# Patient Record
Sex: MALE | HISPANIC OR LATINO | Employment: FULL TIME | URBAN - METROPOLITAN AREA
[De-identification: names, ages, dates, MRNs, and addresses within clinical notes are randomized per-mention and may not be internally consistent; named-entity substitution may affect disease eponyms.]

---

## 2017-04-11 ENCOUNTER — OFFICE VISIT (OUTPATIENT)
Dept: MEDICAL GROUP | Facility: PHYSICIAN GROUP | Age: 47
End: 2017-04-11
Payer: COMMERCIAL

## 2017-04-11 VITALS
SYSTOLIC BLOOD PRESSURE: 138 MMHG | OXYGEN SATURATION: 97 % | TEMPERATURE: 97.3 F | WEIGHT: 195 LBS | HEART RATE: 75 BPM | DIASTOLIC BLOOD PRESSURE: 82 MMHG | HEIGHT: 72 IN | BODY MASS INDEX: 26.41 KG/M2

## 2017-04-11 DIAGNOSIS — Z13.220 ENCOUNTER FOR SCREENING FOR LIPID DISORDER: ICD-10-CM

## 2017-04-11 DIAGNOSIS — F17.200 CURRENT SMOKER: ICD-10-CM

## 2017-04-11 DIAGNOSIS — Z13.0 SCREENING FOR DEFICIENCY ANEMIA: ICD-10-CM

## 2017-04-11 DIAGNOSIS — Z00.00 ANNUAL PHYSICAL EXAM: ICD-10-CM

## 2017-04-11 DIAGNOSIS — Z23 NEED FOR VACCINATION: ICD-10-CM

## 2017-04-11 DIAGNOSIS — Z12.5 PROSTATE CANCER SCREENING: ICD-10-CM

## 2017-04-11 PROCEDURE — 90471 IMMUNIZATION ADMIN: CPT | Performed by: FAMILY MEDICINE

## 2017-04-11 PROCEDURE — 99386 PREV VISIT NEW AGE 40-64: CPT | Mod: 25 | Performed by: FAMILY MEDICINE

## 2017-04-11 PROCEDURE — 90715 TDAP VACCINE 7 YRS/> IM: CPT | Performed by: FAMILY MEDICINE

## 2017-04-11 ASSESSMENT — PAIN SCALES - GENERAL: PAINLEVEL: NO PAIN

## 2017-04-11 NOTE — PROGRESS NOTES
Fan Olivia is a 47 y.o. male here for establishing care and annual physical  HPI: Patient does not have any concerns today.  He is current someday smoker and smokes one pack of cigarettes in a   week. He is interested in stopping. He has quit smoking for 1-2 years in the past and he thinks if he tries he can do it on his own.    Current medicines (including changes today)  No current outpatient prescriptions on file.     No current facility-administered medications for this visit.     He  has no past medical history of Anemia or Asthma.  He  has past surgical history that includes cholecystectomy ().  Social History   Substance Use Topics   • Smoking status: Current Some Day Smoker -- 0.25 packs/day for 32 years     Types: Cigarettes   • Smokeless tobacco: Never Used   • Alcohol Use: Yes      Comment: occasionally     Social History     Social History Narrative   • No narrative on file     Family History   Problem Relation Age of Onset   • Cancer Mother      Unknown type   • Hypertension Father    • Cancer Sister      Breast Cancer     Family Status   Relation Status Death Age   • Mother  68     Unknown Cancer   • Father Alive    • Sister Alive          ROS  Denies fever, chills, sweats, recent weight change  Skin: has occasional itching on arms and legs,negative for rash, scaling, pigmentation, hair or nail changes.  Eyes: negative for visual blurring, double vision, eye pain, floaters and discharge from eyes  Ears/Nose/Throat: negative for tinnitus, vertigo, bleeding gums, dental problem and hoarseness, frequent URI's, sinus trouble, persistent sore throat  Respiratory: negative for persistent cough, hemoptysis, dyspnea  Cardiovascular: negative for palpitations, irregular heart beat, chest pain, or peripheral edema.  Gastrointestinal: negative for poor appetite, dysphagia, nausea, heartburn or reflux, abdominal pain, constipation or diarrhea  Genitourinary: negative for nocturia, dysuria, frequency,  "hesitancy, incontinence  Musculoskeletal: negative for joint pain/swelling and muscle pain/ soreness  Neurologic: negative for headaches, involuntary movements or tremor,muscle weakness  Psychiatric: negative for sleep disturbance, anxiety, depression  Hematologic/Lymphatic/Immunologic: negative for anemia, unusual bruising, swollen glands  Endocrine: negative for temperature intolerance, polydipsia, polyuria, unintentional weight changes.        Objective:     Blood pressure 138/82, pulse 75, temperature 36.3 °C (97.3 °F), height 1.829 m (6' 0.01\"), weight 88.451 kg (195 lb), SpO2 97 %. Body mass index is 26.44 kg/(m^2).  Physical Exam:    GEN: Alert and oriented,well appearing, no acute distress  SKIN: warm, dry to touch, no rashes or lesions in visible areas  PSYCH: mood and affect normal, judgement normal  EYES: Conjunctiva clear, lids normal,pupils equal round and reactive  ENMT: Normal external nose and ears,EACs normal appearing, TM pearly gray with normal light reflex bilaterally,nasal mucosa and turbinates normal appearing without erythema or edema, lips without lesions,good dentition,oropharynx clear  Neck : Trachea midline, no masses or swelling, no thyromegaly  LYMPHATIC : No cervical or supraclavicular lymphadenopathy  RESPIRATORY : Unlabored respiratory effort, no distress noted, clear to auscultation bilaterally, no wheeze, rhonchi, crackles  CARDIOVASCULAR: RRR, S1 S2 normal, no murmurs , gallop , no carotid bruit, no edema of the extremities, pedal pulses B/L 2+  GI: Soft, Non tender, No rebound or guarding, no hepatosplenomegaly, no masses  MUSCULOSKELETAL : Normal gait and stance, no obvious abnormalities   NEURO: No overt focal neurologic deficits,sensation intact        Assessment and Plan:   The following treatment plan was discussed    1. Annual physical exam  Exam normal.Will order routine lab test today  - CBC WITH DIFFERENTIAL; Future  - COMP METABOLIC PANEL; Future  - VITAMIN D,25 HYDROXY; " Future  - LIPID PROFILE; Future  - TDAP VACCINE =>8YO IM  - PROSTATE SPECIFIC AG SCREENING; Future    2. Need for vaccination  - TDAP VACCINE =>8YO IM    3. Current smoker  Patient is advised to stop using tobacco and tobacco products. Long term health effects of smoking reviewed, including lung cancer risk and COPD. We discussed abrupt cessation, nicotine gum or patches, medications such as bupropion or Chantix, and nicotine inhalers including electronic cigarettes. Patient wants to quit on his own without any aid.    4. Prostate cancer screening  - PROSTATE SPECIFIC AG SCREENING; Future    5. Encounter for screening for lipid disorder   LIPID PROFILE; Future    6. Screening for deficiency anemia  - CBC WITH DIFFERENTIAL; Future    Records requested.  Followup: Return in about 1 year (around 4/11/2018) for annual physical exam.         Please note that this dictation was created using voice recognition software. I have made every reasonable attempt to correct obvious errors, but I expect that there are errors of grammar and possibly content that I did not discover before finalizing the note.

## 2017-04-11 NOTE — PATIENT INSTRUCTIONS
"Usted puede dejar de fumar  (You Can Quit Smoking)  Si usted está listo para dejar de fumar o está pensando en ello, ¡felicitaciones! [place] elegido tener ced yaron más lorenzo y vivir más tiempo. Hay muchas formas de dejar de fumar. Los chicles, parches, inhaladores o aerosol nasal con nicotina pueden ayudarlo con el deseo físico. La hipnosis, los grupos de apoyo y algunos medicamentos también pueden ayudarlo.  CONSEJOS PARA DEJAR EL HÁBITO Y NO VOLVER A FUMAR  · Aprenda a predecir priscilla estados de ánimo. Evite que un mal momento sea ced excusa para fumar. Algunas situaciones podrán tentarlo.  · Pídale a priscilla amigos y compañeros que no fumen a carlin alrededor.  · Myron de carlin hogar un lugar rodolfo de humo.  · Nunca diga \"solo patricia\". [place] lleva a desear otro y otro. Recuérdese a usted mismo carlin decisión de dejar de fumar.  · En un papel, myron ced lista de priscilla razones para no fumar. Léalo al menos el mismo número de veces al día en que fume un cigarrillo. Dígase a usted mismo todos los días, \"No quiero fumar Elijo no fumar \"  · Pídale a otra persona de carlin hogar o del trabajo que lo ayude con carlin plan para dejar de fumar.  · Planifique algo para hacer enseguida después de comer o cuando jenn ced taza de café. Salga a caminar o myron algún ejercicio para animarse. [place] lo ayudará a evitar comer en exceso.  · Myron ejercicios de relajación para calmarse y disminuir el estrés. Recuerde, puede estar tenso y nervioso en las dos primeras semanas. [place] pasará.  · Encuentre nuevas actividades para mantener las dianna ocupadas Juegue con ced lapicera, ced moneda o ced bandita elástica. Garabatee o dibuje en un papel.  · Cepíllese los dientes enseguida después de comer. [place] lo ayudará a eliminar el deseo del tabaco luego de las comidas. También puede probar con un enjuague bucal.  · Pruebe con caramelos de goma, pastillas de menta, o caramelos dietéticos para mantener algo en la boca.  SI USTED FUMA Y QUIERE DEJAR EL HÁBITO:  · Evite abastecerse de " "cigarrillos. Nunca compre un cartón. Espere a que un paquete se termine antes de comprar otro.  · Nunca lleve los cigarrillos con usted en el trabajo o en carlin casa.  · Manténgalos lo más lejos posible. Déjeselos a otra persona.  · Nunca lleve los fósforos o el encendedor con usted.  · Siempre pregúntese: \"¿Necesito henok cigarrillo o sólo es un reflejo?\"  · Tavares ced apuesta con alguien a que puede dejar de fumar. Ponga el dinero de los cigarrillos en ced alcancía cada mañana. Si usted fuma, pierde el dinero. Si no fuma, al final de la semana, tendrá más dinero.  · Siga intentando. ¡Se tarda 21 días en cambiar un hábito!  · Hable con carlin médico acerca del uso de medicamentos para dejar de fumar. Entre ellos se incluyen la goma de mascar, pastillas o parches para la piel aziza reemplazo de la nicotina.     Esta información no tiene aziza fin reemplazar el consejo del médico. Asegúrese de hacerle al médico cualquier pregunta que tenga.     Document Released: 01/20/2012 Document Revised: 03/11/2013  Elsevier Interactive Patient Education ©2016 Elsevier Inc.    "

## 2017-04-11 NOTE — MR AVS SNAPSHOT
"        Fan Olivia   2017 1:00 PM   Office Visit   MRN: [id number]    Department:  [facility]   Dept Phone:  [phone]    Description:  Male : 1970   Provider:  Angie Elizalde M.D.           Reason for Visit     Establish Care wellness physical      Allergies as of 2017     Not on File      You were diagnosed with     Annual physical exam   [694662]       Need for vaccination   [375449]       Current smoker   [944136]         Vital Signs     Blood Pressure Pulse Temperature Height Weight Body Mass Index    138/82 mmHg 75 36.3 °C (97.3 °F) 1.829 m (6' 0.01\") 88.451 kg (195 lb) 26.44 kg/m2    Oxygen Saturation Smoking Status                97% Current Some Day Smoker          Basic Information     Date Of Birth Sex Race Ethnicity Preferred Language    1970 Male Unable to Obtain  Origin (Divehi,Lithuanian,Palauan,Kamaljit, etc) English      Problem List              ICD-10-CM Priority Class Noted - Resolved    Current smoker F17.200   2017 - Present      Health Maintenance        Date Due Completion Dates    IMM DTaP/Tdap/Td Vaccine (1 - Tdap) 1987 ---            Current Immunizations     Tdap Vaccine  Incomplete      Below and/or attached are the medications your provider expects you to take. Review all of your home medications and newly ordered medications with your provider and/or pharmacist. Follow medication instructions as directed by your provider and/or pharmacist. Please keep your medication list with you and share with your provider. Update the information when medications are discontinued, doses are changed, or new medications (including over-the-counter products) are added; and carry medication information at all times in the event of emergency situations     Allergies:  No Known Allergies          Medications  Valid as of: 2017 -  1:51 PM    Generic Name Brand Name Tablet Size Instructions for use    .                 Medicines prescribed " today were sent to:     [id number] - [place], [place] - [address] AT [address] [place] [address] [place] [address]    Phone: [phone] Fax: [phone]    Open 24 Hours?: No      Medication refill instructions:       If your prescription bottle indicates you have medication refills left, it is not necessary to call your provider’s office. Please contact your pharmacy and they will refill your medication.    If your prescription bottle indicates you do not have any refills left, you may request refills at any time through one of the following ways: The online [email or web address] system (except Urgent Care), by calling your provider’s office, or by asking your pharmacy to contact your provider’s office with a refill request. Medication refills are processed only during regular business hours and may not be available until the next business day. Your provider may request additional information or to have a follow-up visit with you prior to refilling your medication.   *Please Note: Medication refills are assigned a new Rx number when refilled electronically. Your pharmacy may indicate that no refills were authorized even though a new prescription for the same medication is available at the pharmacy. Please request the medicine by name with the pharmacy before contacting your provider for a refill.        Your To Do List     Future Labs/Procedures Complete By Expires    CBC WITH DIFFERENTIAL  As directed 4/12/2018    COMP METABOLIC PANEL  As directed 4/12/2018    LIPID PROFILE  As directed 4/12/2018    PROSTATE SPECIFIC AG SCREENING  As directed 4/12/2018    VITAMIN D,25 HYDROXY  As directed 4/12/2018      Instructions    Usted puede dejar de fumar  (You Can Quit Smoking)  Si usted está listo para dejar de fumar o está pensando en ello, ¡felicitaciones! [place] elegido tener ced yaron más lorenzo y vivir más tiempo. Hay muchas formas de dejar de fumar. Los chicles, parches, inhaladores o aerosol nasal con  "nicotina pueden ayudarlo con el deseo físico. La hipnosis, los grupos de apoyo y algunos medicamentos también pueden ayudarlo.  CONSEJOS PARA DEJAR EL HÁBITO Y NO VOLVER A FUMAR  · Aprenda a predecir priscilla estados de ánimo. Evite que un mal momento sea ced excusa para fumar. Algunas situaciones podrán tentarlo.  · Pídale a priscilla amigos y compañeros que no fumen a carlin alrededor.  · Myron de carlin hogar un lugar rodolfo de humo.  · Nunca diga \"solo patricia\". [place] lleva a desear otro y otro. Recuérdese a usted mismo carlin decisión de dejar de fumar.  · En un papel, myron ced lista de priscilla razones para no fumar. Léalo al menos el mismo número de veces al día en que fume un cigarrillo. Dígase a usted mismo todos los días, \"No quiero fumar Elijo no fumar \"  · Pídale a otra persona de carlin hogar o del trabajo que lo ayude con carlin plan para dejar de fumar.  · Planifique algo para hacer enseguida después de comer o cuando jenn ced taza de café. Salga a caminar o myron algún ejercicio para animarse. [place] lo ayudará a evitar comer en exceso.  · Myron ejercicios de relajación para calmarse y disminuir el estrés. Recuerde, puede estar tenso y nervioso en las dos primeras semanas. [place] pasará.  · Encuentre nuevas actividades para mantener las dianna ocupadas Juegue con ced lapicera, ced moneda o ced bandita elástica. Garabatee o dibuje en un papel.  · Cepíllese los dientes enseguida después de comer. [place] lo ayudará a eliminar el deseo del tabaco luego de las comidas. También puede probar con un enjuague bucal.  · Pruebe con caramelos de goma, pastillas de menta, o caramelos dietéticos para mantener algo en la boca.  SI USTED FUMA Y QUIERE DEJAR EL HÁBITO:  · Evite abastecerse de cigarrillos. Nunca compre un cartón. Espere a que un paquete se termine antes de comprar otro.  · Nunca lleve los cigarrillos con usted en el trabajo o en carlin casa.  · Manténgalos lo más lejos posible. Déjeselos a otra persona.  · Nunca lleve los fósforos o el encendedor con " "usted.  · Siempre pregúntese: \"¿Necesito henok cigarrillo o sólo es un reflejo?\"  · Tavares ced apuesta con alguien a que puede dejar de fumar. Ponga el dinero de los cigarrillos en ced alcancía cada mañana. Si usted fuma, pierde el dinero. Si no fuma, al final de la semana, tendrá más dinero.  · Siga intentando. ¡Se tarda 21 días en cambiar un hábito!  · Hable con carlin médico acerca del uso de medicamentos para dejar de fumar. Entre ellos se incluyen la goma de mascar, pastillas o parches para la piel aziza reemplazo de la nicotina.     Esta información no tiene aziza fin reemplazar el consejo del médico. Asegúrese de hacerle al médico cualquier pregunta que tenga.     Document Released: 01/20/2012 Document Revised: 03/11/2013  [employer] Interactive Patient Education ©2016 Elsevier Inc.            SmarTots Access Code: [email or web address]  Expires: 5/11/2017  1:17 PM    SmarTots  A secure, online tool to manage your health information     [employer] SmarTots® is a secure, online tool that connects you to your personalized health information from the privacy of your home -- day or night - making it very easy for you to manage your healthcare. Once the activation process is completed, you can even access your medical information using the SmarTots fabricio, which is available for free in the Apple Fabricio store or Google Play store.     SmarTots provides the following levels of access (as shown below):   My Chart Features   [facility] Doctor [facility]  Specialists [facility]  Urgent  Care Non-[facility]  Primary Care  Doctor   Email your healthcare team securely and privately 24/7 X X X    Manage appointments: schedule your next appointment; view details of past/upcoming appointments X      Request prescription refills. X      View recent personal medical records, including lab and immunizations X X X X   View health record, including health history, allergies, medications X X X X   Read reports about your outpatient visits, procedures, " consult and ER notes X X X X   See your discharge summary, which is a recap of your hospital and/or ER visit that includes your diagnosis, lab results, and care plan. X X       How to register for Spotivate:  1. Go to  [email or web address].  2. Click on the Sign Up Now box, which takes you to the New Member Sign Up page. You will need to provide the following information:  a. Enter your Spotivate Access Code exactly as it appears at the top of this page. (You will not need to use this code after you’ve completed the sign-up process. If you do not sign up before the expiration date, you must request a new code.)   b. Enter your date of birth.   c. Enter your home email address.   d. Click Submit, and follow the next screen’s instructions.  3. Create a Spotivate ID. This will be your Spotivate login ID and cannot be changed, so think of one that is secure and easy to remember.  4. Create a Spotivate password. You can change your password at any time.  5. Enter your Password Reset Question and Answer. This can be used at a later time if you forget your password.   6. Enter your e-mail address. This allows you to receive e-mail notifications when new information is available in Spotivate.  7. Click Sign Up. You can now view your health information.    For assistance activating your Spotivate account, call [phone]        Quit Tobacco Information     Do you want to quit using tobacco?    Quitting tobacco decreases risks of cancer, heart and lung disease, increases life expectancy, improves sense of taste and smell, and increases spending money, among other benefits.    If you are thinking about quitting, we can help.  • [employer] Quit Tobacco Program: 906.605.3300  o Program occurs weekly for four weeks and includes pharmacist consultation on products to support quitting smoking or chewing tobacco. A provider referral is needed for pharmacist consultation.  • Tobacco Users Help Hotline: 4-800-QUIT-NOW (890-7514) or  https://[place].quitlogix.org/  o Free, confidential telephone and online coaching for [place] residents. Sessions are designed on a schedule that is convenient for you. Eligible clients receive free nicotine replacement therapy.  • Nationally: www.smokefree.gov  o Information and professional assistance to support both immediate and long-term needs as you become, and remain, a non-smoker. Smokefree.gov allows you to choose the help that best fits your needs.

## 2017-04-17 ENCOUNTER — HOSPITAL ENCOUNTER (OUTPATIENT)
Dept: LAB | Facility: MEDICAL CENTER | Age: 47
End: 2017-04-17
Attending: FAMILY MEDICINE
Payer: COMMERCIAL

## 2017-04-17 DIAGNOSIS — E55.9 VITAMIN D DEFICIENCY: ICD-10-CM

## 2017-04-17 DIAGNOSIS — Z13.0 SCREENING FOR DEFICIENCY ANEMIA: ICD-10-CM

## 2017-04-17 DIAGNOSIS — R73.01 ELEVATED FASTING GLUCOSE: ICD-10-CM

## 2017-04-17 DIAGNOSIS — Z00.00 ANNUAL PHYSICAL EXAM: ICD-10-CM

## 2017-04-17 LAB
25(OH)D3 SERPL-MCNC: 9 NG/ML (ref 30–100)
ALBUMIN SERPL BCP-MCNC: 4.1 G/DL (ref 3.2–4.9)
ALBUMIN/GLOB SERPL: 1.4 G/DL
ALP SERPL-CCNC: 55 U/L (ref 30–99)
ALT SERPL-CCNC: 39 U/L (ref 2–50)
ANION GAP SERPL CALC-SCNC: 5 MMOL/L (ref 0–11.9)
AST SERPL-CCNC: 18 U/L (ref 12–45)
BASOPHILS # BLD AUTO: 0.9 % (ref 0–1.8)
BASOPHILS # BLD: 0.08 K/UL (ref 0–0.12)
BILIRUB SERPL-MCNC: 0.5 MG/DL (ref 0.1–1.5)
BUN SERPL-MCNC: 14 MG/DL (ref 8–22)
CALCIUM SERPL-MCNC: 9.2 MG/DL (ref 8.5–10.5)
CHLORIDE SERPL-SCNC: 108 MMOL/L (ref 96–112)
CHOLEST SERPL-MCNC: 132 MG/DL (ref 100–199)
CO2 SERPL-SCNC: 28 MMOL/L (ref 20–33)
CREAT SERPL-MCNC: 1.02 MG/DL (ref 0.5–1.4)
EOSINOPHIL # BLD AUTO: 0.33 K/UL (ref 0–0.51)
EOSINOPHIL NFR BLD: 3.7 % (ref 0–6.9)
ERYTHROCYTE [DISTWIDTH] IN BLOOD BY AUTOMATED COUNT: 41.5 FL (ref 35.9–50)
GFR SERPL CREATININE-BSD FRML MDRD: >60 ML/MIN/1.73 M 2
GLOBULIN SER CALC-MCNC: 2.9 G/DL (ref 1.9–3.5)
GLUCOSE SERPL-MCNC: 106 MG/DL (ref 65–99)
HCT VFR BLD AUTO: 50.9 % (ref 42–52)
HDLC SERPL-MCNC: 43 MG/DL
HGB BLD-MCNC: 17.1 G/DL (ref 14–18)
IMM GRANULOCYTES # BLD AUTO: 0.05 K/UL (ref 0–0.11)
IMM GRANULOCYTES NFR BLD AUTO: 0.6 % (ref 0–0.9)
LDLC SERPL CALC-MCNC: 63 MG/DL
LYMPHOCYTES # BLD AUTO: 3.73 K/UL (ref 1–4.8)
LYMPHOCYTES NFR BLD: 41.6 % (ref 22–41)
MCH RBC QN AUTO: 30.1 PG (ref 27–33)
MCHC RBC AUTO-ENTMCNC: 33.6 G/DL (ref 33.7–35.3)
MCV RBC AUTO: 89.6 FL (ref 81.4–97.8)
MONOCYTES # BLD AUTO: 0.53 K/UL (ref 0–0.85)
MONOCYTES NFR BLD AUTO: 5.9 % (ref 0–13.4)
NEUTROPHILS # BLD AUTO: 4.24 K/UL (ref 1.82–7.42)
NEUTROPHILS NFR BLD: 47.3 % (ref 44–72)
NRBC # BLD AUTO: 0 K/UL
NRBC BLD AUTO-RTO: 0 /100 WBC
PLATELET # BLD AUTO: 160 K/UL (ref 164–446)
PMV BLD AUTO: 12.2 FL (ref 9–12.9)
POTASSIUM SERPL-SCNC: 4.3 MMOL/L (ref 3.6–5.5)
PROT SERPL-MCNC: 7 G/DL (ref 6–8.2)
PSA SERPL-MCNC: 0.39 NG/ML (ref 0–4)
RBC # BLD AUTO: 5.68 M/UL (ref 4.7–6.1)
SODIUM SERPL-SCNC: 141 MMOL/L (ref 135–145)
TRIGL SERPL-MCNC: 131 MG/DL (ref 0–149)
WBC # BLD AUTO: 9 K/UL (ref 4.8–10.8)

## 2017-04-17 PROCEDURE — 36415 COLL VENOUS BLD VENIPUNCTURE: CPT

## 2017-04-17 PROCEDURE — 85025 COMPLETE CBC W/AUTO DIFF WBC: CPT

## 2017-04-17 PROCEDURE — 80061 LIPID PANEL: CPT

## 2017-04-17 PROCEDURE — 84153 ASSAY OF PSA TOTAL: CPT

## 2017-04-17 PROCEDURE — 80053 COMPREHEN METABOLIC PANEL: CPT

## 2017-04-17 PROCEDURE — 82306 VITAMIN D 25 HYDROXY: CPT

## 2017-04-19 ENCOUNTER — TELEPHONE (OUTPATIENT)
Dept: MEDICAL GROUP | Facility: PHYSICIAN GROUP | Age: 47
End: 2017-04-19

## 2017-04-19 NOTE — TELEPHONE ENCOUNTER
----- Message from Angie Elizalde M.D. sent at 4/17/2017  4:15 PM PDT -----  Please inform patient that I received his blood test results.His blood sugar is slightly elevated.I have ordered another test A1C to see if he is diabetic or at risk for diabetes.Please ask him to get it done.  Also his Vitamin D level is low.I have sent prescription for Vitamin D capsules to his pharmacy.We will recheck level in 6 months.  All other tests including cholesterol, screening for prostate cancer etc are normal.    Angie Elizalde M.D.

## 2017-06-14 ENCOUNTER — OFFICE VISIT (OUTPATIENT)
Dept: URGENT CARE | Facility: PHYSICIAN GROUP | Age: 47
End: 2017-06-14
Payer: COMMERCIAL

## 2017-06-14 VITALS
OXYGEN SATURATION: 97 % | TEMPERATURE: 99.5 F | HEART RATE: 88 BPM | WEIGHT: 200 LBS | DIASTOLIC BLOOD PRESSURE: 86 MMHG | BODY MASS INDEX: 27.12 KG/M2 | RESPIRATION RATE: 14 BRPM | SYSTOLIC BLOOD PRESSURE: 134 MMHG

## 2017-06-14 DIAGNOSIS — J01.40 ACUTE NON-RECURRENT PANSINUSITIS: ICD-10-CM

## 2017-06-14 DIAGNOSIS — J10.1 INFLUENZA B: Primary | ICD-10-CM

## 2017-06-14 DIAGNOSIS — J06.9 URI WITH COUGH AND CONGESTION: ICD-10-CM

## 2017-06-14 DIAGNOSIS — R52 BODY ACHES: ICD-10-CM

## 2017-06-14 LAB
FLUAV+FLUBV AG SPEC QL IA: NORMAL
INT CON NEG: NEGATIVE
INT CON POS: POSITIVE

## 2017-06-14 PROCEDURE — 87804 INFLUENZA ASSAY W/OPTIC: CPT | Performed by: PHYSICIAN ASSISTANT

## 2017-06-14 PROCEDURE — 99204 OFFICE O/P NEW MOD 45 MIN: CPT | Performed by: PHYSICIAN ASSISTANT

## 2017-06-14 RX ORDER — OSELTAMIVIR PHOSPHATE 75 MG/1
75 CAPSULE ORAL 2 TIMES DAILY
Qty: 10 CAP | Refills: 0 | Status: SHIPPED | OUTPATIENT
Start: 2017-06-14 | End: 2017-06-19

## 2017-06-14 RX ORDER — AMOXICILLIN AND CLAVULANATE POTASSIUM 875; 125 MG/1; MG/1
1 TABLET, FILM COATED ORAL 2 TIMES DAILY
Qty: 20 TAB | Refills: 0 | Status: CANCELLED | OUTPATIENT
Start: 2017-06-14 | End: 2017-06-24

## 2017-06-14 RX ORDER — PROMETHAZINE HYDROCHLORIDE AND CODEINE PHOSPHATE 6.25; 1 MG/5ML; MG/5ML
5 SYRUP ORAL 4 TIMES DAILY PRN
Qty: 120 ML | Refills: 0 | Status: SHIPPED | OUTPATIENT
Start: 2017-06-14 | End: 2017-06-21

## 2017-06-14 NOTE — Clinical Note
June 14, 2017       Patient: Fan Olivia   YOB: 1970   Date of Visit: 6/14/2017         To Whom It May Concern:    It is my medical opinion that Fan Olivia may be excused from work for the dates of 6/14/17-6/16/17.      If you have any questions or concerns, please don't hesitate to call [phone]          Sincerely,          Max Thayer PA-C  Electronically Signed

## 2017-06-14 NOTE — PATIENT INSTRUCTIONS
"Influenza Facts  Flu (influenza) is a contagious respiratory illness caused by the influenza viruses. It can cause mild to severe illness. While most healthy people recover from the flu without specific treatment and without complications, older people, young children, and people with certain health conditions are at higher risk for serious complications from the flu, including death.  CAUSES   · The flu virus is spread from person to person by respiratory droplets from coughing and sneezing.   · A person can also become infected by touching an object or surface with a virus on it and then touching their mouth, eye or nose.   · Adults may be able to infect others from 1 day before symptoms occur and up to 7 days after getting sick. So it is possible to give someone the flu even before you know you are sick and continue to infect others while you are sick.   SYMPTOMS   · Fever (usually high).   · Headache.   · Tiredness (can be extreme).   · Cough.   · Sore throat.   · Runny or stuffy nose.   · Body aches.   · Diarrhea and vomiting may also occur, particularly in children.   · These symptoms are referred to as \"flu-like symptoms\". A lot of different illnesses, including the common cold, can have similar symptoms.   DIAGNOSIS   · There are tests that can determine if you have the flu as long you are tested within the first 2 or 3 days of illness.   · A doctor's exam and additional tests may be needed to identify if you have a disease that is a complicating the flu.   RISKS AND COMPLICATIONS   Some of the complications caused by the flu include:  · Bacterial pneumonia or progressive pneumonia caused by the flu virus.   · Loss of body fluids (dehydration).   · Worsening of chronic medical conditions, such as heart failure, asthma, or diabetes.   · Sinus problems and ear infections.   HOME CARE INSTRUCTIONS   · Seek medical care early on.   · If you are at high risk from complications of the flu, consult your health-care " provider as soon as you develop flu-like symptoms. Those at high risk for complications include:   · People 65 years or older.   · People with chronic medical conditions, including diabetes.   · Pregnant women.   · Young children.   · Your caregiver may recommend use of an antiviral medication to help treat the flu.   · If you get the flu, get plenty of rest, drink a lot of liquids, and avoid using alcohol and tobacco.   · You can take over-the-counter medications to relieve the symptoms of the flu if your caregiver approves. (Never give aspirin to children or teenagers who have flu-like symptoms, particularly fever).   PREVENTION   The single best way to prevent the flu is to get a flu vaccine each fall. Other measures that can help protect against the flu are:  · Antiviral Medications   · A number of antiviral drugs are approved for use in preventing the flu. These are prescription medications, and a doctor should be consulted before they are used.   · Habits for Good Health   · Cover your nose and mouth with a tissue when you cough or sneeze, throw the tissue away after you use it.   · Wash your hands often with soap and water, especially after you cough or sneeze. If you are not near water, use an alcohol-based hand .   · Avoid people who are sick.   · If you get the flu, stay home from work or school. Avoid contact with other people so that you do not make them sick, too.   · Try not to touch your eyes, nose, or mouth as germs ore often spread this way.   IN CHILDREN, EMERGENCY WARNING SIGNS THAT NEED URGENT MEDICAL ATTENTION:  · Fast breathing or trouble breathing.   · Bluish skin color.   · Not drinking enough fluids.   · Not waking up or not interacting.   · Being so irritable that the child does not want to be held.   · Flu-like symptoms improve but then return with fever and worse cough.   · Fever with a rash.   IN ADULTS, EMERGENCY WARNING SIGNS THAT NEED URGENT MEDICAL ATTENTION:  · Difficulty  breathing or shortness of breath.   · Pain or pressure in the chest or abdomen.   · Sudden dizziness.   · Confusion.   · Severe or persistent vomiting.   SEEK IMMEDIATE MEDICAL CARE IF:   You or someone you know is experiencing any of the symptoms above. When you arrive at the emergency center,report that you think you have the flu. You may be asked to wear a mask and/or sit in a secluded area to protect others from getting sick.  MAKE SURE YOU:   · Understand these instructions.   · Monitor your condition.   · Seek medical care if you are getting worse, or not improving.   Document Released: 12/20/2004 Document Revised: 03/11/2013 Document Reviewed: 09/16/2010  SafeStore® Patient Information ©2013 [employer].

## 2017-06-14 NOTE — MR AVS SNAPSHOT
Fan Olivia   2017 12:45 PM   Office Visit   MRN: [id number]    Department:  [facility]   Dept Phone:  [phone]    Description:  Male : 1970   Provider:  Max Thayer PA-C           Reason for Visit     Cold Symptoms fever body aches, ear pain, sinus headaches x4days      Allergies as of 2017     No Known Allergies      You were diagnosed with     Influenza B   [916931]  -  Primary     Acute non-recurrent pansinusitis   [5128383]       URI with cough and congestion   [2813460]       Body aches   [724135]         Vital Signs     Blood Pressure Pulse Temperature Respirations Weight Oxygen Saturation    134/86 mmHg 88 37.5 °C (99.5 °F) 14 90.719 kg (200 lb) 97%    Smoking Status                   Current Some Day Smoker           Basic Information     Date Of Birth Sex Race Ethnicity Preferred Language    1970 Male Unable to Obtain  Origin (Italian,Fijian,Russian,Citizen of [place], etc) English      Problem List              ICD-10-CM Priority Class Noted - Resolved    Current smoker F17.200   2017 - Present    Elevated fasting glucose R73.01   2017 - Present    Vitamin D deficiency E55.9   2017 - Present      Health Maintenance        Date Due Completion Dates    IMM PNEUMOCOCCAL 19-64 (ADULT) MEDIUM RISK SERIES (1 of 1 - PPSV23) 1987 ---    IMM DTaP/Tdap/Td Vaccine (2 - Td) 2027            Current Immunizations     Tdap Vaccine 2017      Below and/or attached are the medications your provider expects you to take. Review all of your home medications and newly ordered medications with your provider and/or pharmacist. Follow medication instructions as directed by your provider and/or pharmacist. Please keep your medication list with you and share with your provider. Update the information when medications are discontinued, doses are changed, or new medications (including over-the-counter products) are added; and carry medication  information at all times in the event of emergency situations     Allergies:  No Known Allergies          Medications  Valid as of: June 14, 2017 -  1:19 PM    Generic Name Brand Name Tablet Size Instructions for use    Cholecalciferol (Cap) Cholecalciferol 88157 UNIT Take 1 capsule orally once weekly for 8 weeks, followed by 1 capsule twice monthly for maintenance.        Oseltamivir Phosphate (Cap) TAMIFLU 75 MG Take 1 Cap by mouth 2 times a day for 5 days.        Promethazine-Codeine (Syrup) PHENERGAN-CODEINE 6.25-10 MG/5ML Take 5 mL by mouth 4 times a day as needed for Cough for up to 7 days.        .                 Medicines prescribed today were sent to:     [facility][id number] - [place], [place] - [address] [place] [address]    Phone: [phone] Fax: [phone]    Open 24 Hours?: No      Medication refill instructions:       If your prescription bottle indicates you have medication refills left, it is not necessary to call your provider’s office. Please contact your pharmacy and they will refill your medication.    If your prescription bottle indicates you do not have any refills left, you may request refills at any time through one of the following ways: The online [employer] system (except Urgent Care), by calling your provider’s office, or by asking your pharmacy to contact your provider’s office with a refill request. Medication refills are processed only during regular business hours and may not be available until the next business day. Your provider may request additional information or to have a follow-up visit with you prior to refilling your medication.   *Please Note: Medication refills are assigned a new Rx number when refilled electronically. Your pharmacy may indicate that no refills were authorized even though a new prescription for the same medication is available at the pharmacy. Please request the medicine by name with the pharmacy before  "contacting your provider for a refill.        Instructions    Influenza Facts  Flu (influenza) is a contagious respiratory illness caused by the influenza viruses. It can cause mild to severe illness. While most healthy people recover from the flu without specific treatment and without complications, older people, young children, and people with certain health conditions are at higher risk for serious complications from the flu, including death.  CAUSES   · The flu virus is spread from person to person by respiratory droplets from coughing and sneezing.   · A person can also become infected by touching an object or surface with a virus on it and then touching their mouth, eye or nose.   · Adults may be able to infect others from 1 day before symptoms occur and up to 7 days after getting sick. So it is possible to give someone the flu even before you know you are sick and continue to infect others while you are sick.   SYMPTOMS   · Fever (usually high).   · Headache.   · Tiredness (can be extreme).   · Cough.   · Sore throat.   · Runny or stuffy nose.   · Body aches.   · Diarrhea and vomiting may also occur, particularly in children.   · These symptoms are referred to as \"flu-like symptoms\". A lot of different illnesses, including the common cold, can have similar symptoms.   DIAGNOSIS   · There are tests that can determine if you have the flu as long you are tested within the first 2 or 3 days of illness.   · A doctor's exam and additional tests may be needed to identify if you have a disease that is a complicating the flu.   RISKS AND COMPLICATIONS   Some of the complications caused by the flu include:  · Bacterial pneumonia or progressive pneumonia caused by the flu virus.   · Loss of body fluids (dehydration).   · Worsening of chronic medical conditions, such as heart failure, asthma, or diabetes.   · Sinus problems and ear infections.   HOME CARE INSTRUCTIONS   · Seek medical care early on.   · If you are at " high risk from complications of the flu, consult your health-care provider as soon as you develop flu-like symptoms. Those at high risk for complications include:   · People 65 years or older.   · People with chronic medical conditions, including diabetes.   · Pregnant women.   · Young children.   · Your caregiver may recommend use of an antiviral medication to help treat the flu.   · If you get the flu, get plenty of rest, drink a lot of liquids, and avoid using alcohol and tobacco.   · You can take over-the-counter medications to relieve the symptoms of the flu if your caregiver approves. (Never give aspirin to children or teenagers who have flu-like symptoms, particularly fever).   PREVENTION   The single best way to prevent the flu is to get a flu vaccine each fall. Other measures that can help protect against the flu are:  · Antiviral Medications   · A number of antiviral drugs are approved for use in preventing the flu. These are prescription medications, and a doctor should be consulted before they are used.   · Habits for Good Health   · Cover your nose and mouth with a tissue when you cough or sneeze, throw the tissue away after you use it.   · Wash your hands often with soap and water, especially after you cough or sneeze. If you are not near water, use an alcohol-based hand .   · Avoid people who are sick.   · If you get the flu, stay home from work or school. Avoid contact with other people so that you do not make them sick, too.   · Try not to touch your eyes, nose, or mouth as germs ore often spread this way.   IN CHILDREN, EMERGENCY WARNING SIGNS THAT NEED URGENT MEDICAL ATTENTION:  · Fast breathing or trouble breathing.   · Bluish skin color.   · Not drinking enough fluids.   · Not waking up or not interacting.   · Being so irritable that the child does not want to be held.   · Flu-like symptoms improve but then return with fever and worse cough.   · Fever with a rash.   IN ADULTS, EMERGENCY  WARNING SIGNS THAT NEED URGENT MEDICAL ATTENTION:  · Difficulty breathing or shortness of breath.   · Pain or pressure in the chest or abdomen.   · Sudden dizziness.   · Confusion.   · Severe or persistent vomiting.   SEEK IMMEDIATE MEDICAL CARE IF:   You or someone you know is experiencing any of the symptoms above. When you arrive at the emergency center,report that you think you have the flu. You may be asked to wear a mask and/or sit in a secluded area to protect others from getting sick.  MAKE SURE YOU:   · Understand these instructions.   · Monitor your condition.   · Seek medical care if you are getting worse, or not improving.   Document Released: 12/20/2004 Document Revised: 03/11/2013 Document Reviewed: 09/16/2010  ExitCare® Patient Information ©2013 [employer].            FID3 Access Code: [email or web address]  Expires: 7/14/2017  1:19 PM    FID3  A secure, online tool to manage your health information     [employer]’s FID3® is a secure, online tool that connects you to your personalized health information from the privacy of your home -- day or night - making it very easy for you to manage your healthcare. Once the activation process is completed, you can even access your medical information using the FID3 fabricio, which is available for free in the Apple [employer]io store or Google Play store.     FID3 provides the following levels of access (as shown below):   My Chart Features   [facility] Doctor [facility]  Specialists [facility]  Urgent  Care Non-[facility]  Primary Care  Doctor   Email your healthcare team securely and privately 24/7 X X X    Manage appointments: schedule your next appointment; view details of past/upcoming appointments X      Request prescription refills. X      View recent personal medical records, including lab and immunizations X X X X   View health record, including health history, allergies, medications X X X X   Read reports about your outpatient visits, procedures,  consult and ER notes X X X X   See your discharge summary, which is a recap of your hospital and/or ER visit that includes your diagnosis, lab results, and care plan. X X       How to register for Rev[employer]:  1. Go to  [email or web address].  2. Click on the Sign Up Now box, which takes you to the New Member Sign Up page. You will need to provide the following information:  a. Enter your Revisu Access Code exactly as it appears at the top of this page. (You will not need to use this code after you’ve completed the sign-up process. If you do not sign up before the expiration date, you must request a new code.)   b. Enter your date of birth.   c. Enter your home email address.   d. Click Submit, and follow the next screen’s instructions.  3. Create a Revisu ID. This will be your Revisu login ID and cannot be changed, so think of one that is secure and easy to remember.  4. Create a Revisu password. You can change your password at any time.  5. Enter your Password Reset Question and Answer. This can be used at a later time if you forget your password.   6. Enter your e-mail address. This allows you to receive e-mail notifications when new information is available in Revisu.  7. Click Sign Up. You can now view your health information.    For assistance activating your Revisu account, call [phone]        Quit Tobacco Information     Do you want to quit using tobacco?    Quitting tobacco decreases risks of cancer, heart and lung disease, increases life expectancy, improves sense of taste and smell, and increases spending money, among other benefits.    If you are thinking about quitting, we can help.  • [facility] Quit Tobacco Program: 864.230.3852  o Program occurs weekly for four weeks and includes pharmacist consultation on products to support quitting smoking or chewing tobacco. A provider referral is needed for pharmacist consultation.  • Tobacco Users Help Hotline: 0-800-QUIT-NOW (778-3729) or  https://[place].quitlogix.org/  o Free, confidential telephone and online coaching for [place] residents. Sessions are designed on a schedule that is convenient for you. Eligible clients receive free nicotine replacement therapy.  • Nationally: www.smokefree.gov  o Information and professional assistance to support both immediate and long-term needs as you become, and remain, a non-smoker. Smokefree.gov allows you to choose the help that best fits your needs.

## 2017-06-14 NOTE — PROGRESS NOTES
Subjective:    Pt is a 47 y.o. male who presents with Cold Symptoms            HPI  PT presents to  clinic today complaining of sore throat, fevers, chills, watery eyes, pressure in ears, cough, fatigue, runny nose. PT denies CP, SOB, NVD, abdominal pain, joint pain. PT states these symptoms began around 4 days ago and that the pt's family has been sick on and off for the last week. Pt has not taken any medications for this condition. PT states the pain is a 7/10 with sinus pressure and coughing fits, aching in nature and worse at night. The pt's medication list, problem list, and allergies have been evaluated and reviewed during today's visit.      PMH:  Negative per pt.      PSH:  Past Surgical History   Procedure Laterality Date   • Cholecystectomy         Fam Hx:    family history includes Cancer in his mother and sister; Hypertension in his father.  Family Status   Relation Status Death Age   • Mother  68     Unknown Cancer   • Father Alive    • Sister Alive        Soc HX:  Social History     Social History   • Marital Status:      Spouse Name: N/A   • Number of Children: N/A   • Years of Education: N/A     Occupational History   • Not on file.     Social History Main Topics   • Smoking status: Current Some Day Smoker -- 0.25 packs/day for 32 years     Types: Cigarettes   • Smokeless tobacco: Never Used   • Alcohol Use: Yes      Comment: occasionally   • Drug Use: No   • Sexual Activity:     Partners: Female     Other Topics Concern   • Not on file     Social History Narrative   • No narrative on file         Medications:    Current outpatient prescriptions:   •  Cholecalciferol 25860 UNIT Cap, Take 1 capsule orally once weekly for 8 weeks, followed by 1 capsule twice monthly for maintenance., Disp: 30 Cap, Rfl: 0      Allergies:  Review of patient's allergies indicates no known allergies.      ROS  Constitutional: Positive for chills and malaise/fatigue.   HENT: Positive for congestion and  sore throat. Negative for ear pain.    Eyes: Negative for blurred vision, double vision and photophobia.   Respiratory: Positive for cough and sputum production. Negative for hemoptysis, shortness of breath and wheezing.    Cardiovascular: Negative for chest pain and palpitations.   Gastrointestinal: Negative for nausea, vomiting, abdominal pain, diarrhea and constipation.   Genitourinary: Negative for dysuria and flank pain.   Musculoskeletal: Negative for falls and myalgias.   Skin: Negative for itching and rash.   Neurological: Positive for headaches. Negative for dizziness and tingling.   Endo/Heme/Allergies: Does not bruise/bleed easily.   Psychiatric/Behavioral: Negative for depression. The patient is not nervous/anxious.    All other systems reviewed and are negative.         Objective:     /86 mmHg  Pulse 88  Temp(Src) 37.5 °C (99.5 °F)  Resp 14  Wt 90.719 kg (200 lb)  SpO2 97%     Physical Exam      Constitutional: PT is oriented to person, place, and time. PT appears well-developed and well-nourished. No distress.   HENT:   Head: Normocephalic and atraumatic.   Right Ear: Hearing, tympanic membrane, external ear and ear canal normal.   Left Ear: Hearing, tympanic membrane, external ear and ear canal normal.   Nose: Mucosal edema, rhinorrhea and sinus tenderness present. Right sinus exhibits frontal sinus tenderness. Left sinus exhibits frontal sinus tenderness.   Mouth/Throat: Uvula is midline. Mucous membranes are pale. Posterior oropharyngeal edema and posterior oropharyngeal erythema present. No oropharyngeal exudate.   Eyes: Conjunctivae normal and EOM are normal. Pupils are equal, round, and reactive to light.   Neck: Normal range of motion. Neck supple. No thyromegaly present.   Cardiovascular: Normal rate, regular rhythm, normal heart sounds and intact distal pulses.  Exam reveals no gallop and no friction rub.    No murmur heard.  Pulmonary/Chest: Effort normal and breath sounds normal.  No respiratory distress. PT has no wheezes. PT has no rales. PT exhibits no tenderness.   Abdominal: Soft. Bowel sounds are normal. PT exhibits no distension and no mass. There is no tenderness. There is no rebound and no guarding.   Musculoskeletal: Normal range of motion. PT exhibits no edema and no tenderness.   Lymphadenopathy:     PT has no cervical adenopathy.   Neurological: PT is alert and oriented to person, place, and time. PT displays normal reflexes. No cranial nerve deficit. PT exhibits normal muscle tone. Coordination normal.   Skin: Skin is warm and dry. No rash noted. No erythema.   Psychiatric: PT has a normal mood and affect. PT behavior is normal. Judgment and thought content normal.          Assessment/Plan:     1. Influenza B    - Tamiflu    2. Acute non-recurrent pansinusitis      2. URI with cough and congestion    - POCT Influenza A/B-->POSB  - promethazine-codeine (PHENERGAN-CODEINE) 6.25-10 MG/5ML Syrup; Take 5 mL by mouth 4 times a day as needed for Cough for up to 7 days.  Dispense: 120 mL; Refill: 0    3. Body aches    Rest, fluids encouraged.  OTC decongestant for congestion/cough  AVS with medical info given.  Pt was in full understanding and agreement with the plan.  Follow-up as needed if symptoms worsen or fail to improve.

## 2021-10-26 ENCOUNTER — OFFICE VISIT (OUTPATIENT)
Dept: MEDICAL GROUP | Facility: CLINIC | Age: 51
End: 2021-10-26
Payer: COMMERCIAL

## 2021-10-26 VITALS
RESPIRATION RATE: 16 BRPM | SYSTOLIC BLOOD PRESSURE: 146 MMHG | TEMPERATURE: 97.4 F | DIASTOLIC BLOOD PRESSURE: 89 MMHG | WEIGHT: 192 LBS | BODY MASS INDEX: 26.01 KG/M2 | HEART RATE: 66 BPM | OXYGEN SATURATION: 94 % | HEIGHT: 72 IN

## 2021-10-26 DIAGNOSIS — H93.11 TINNITUS OF RIGHT EAR: ICD-10-CM

## 2021-10-26 DIAGNOSIS — R06.83 SNORING: ICD-10-CM

## 2021-10-26 DIAGNOSIS — R51.9 NONINTRACTABLE HEADACHE, UNSPECIFIED CHRONICITY PATTERN, UNSPECIFIED HEADACHE TYPE: ICD-10-CM

## 2021-10-26 PROBLEM — H93.19 TINNITUS: Status: ACTIVE | Noted: 2021-10-26

## 2021-10-26 PROCEDURE — 99203 OFFICE O/P NEW LOW 30 MIN: CPT | Mod: GE | Performed by: STUDENT IN AN ORGANIZED HEALTH CARE EDUCATION/TRAINING PROGRAM

## 2021-10-26 RX ORDER — AMITRIPTYLINE HYDROCHLORIDE 10 MG/1
10 TABLET, FILM COATED ORAL
Qty: 90 TABLET | Refills: 1 | Status: SHIPPED | OUTPATIENT
Start: 2021-10-26

## 2021-10-26 NOTE — ASSESSMENT & PLAN NOTE
Patient with history of headaches, describes as tension-like  -Has them about 3-4 times per day Tylenol/Motrin helps a little bit  -Interested in starting a daily medicine    -Amitriptyline prescribed for patient. F/u in ~1 month

## 2021-10-26 NOTE — ASSESSMENT & PLAN NOTE
- Patient reports that he has been snoring, feels fatigue during the day, Mallampati score of 3  - Agrees to sleep study     - Sleep study ordered today

## 2021-10-26 NOTE — PATIENT INSTRUCTIONS
SLEEP STUDY INSTRUCTIONS    1. Our main concern is to provide the best test and evaluation of your sleep and your cooperation in following the guidelines is very necessary.    2. We have no facilities for family members or guests at the sleep center. Special arrangements will be made for children requiring overnight sleep studies.    3. Unless otherwise instructed, AVOID alcoholic beverages on the day of your sleep study.    4. DO NOT drink coffee or caffeine-containing beverages after 12:00 noon on the day of your sleep study.    5. There is NO smoking at the sleep center.    6. Try to maintain a usual daytime schedule prior to the study (avoid unusual physical activity or meals).    7. DO NOT take a nap on the day of your study.    8. This is an outpatient procedure (test); therefore, nursing services, medications, and meals ARE NOT provided. If you take medications, bring them with you and take them on the schedule you do at home.    9. Please fill your sleep aid prescription (Ambien or Lunesta) and bring to your sleep study. Even patients who normally have no problem going to sleep often need a sleep aid in this different environment.    10. We ask that you wear conventional sleep attire (pajamas or sweats) for the sleep study. We discourage patients from wearing only their underwear to bed. We recommend two-piece pajamas as the techs will need to apply sensors to your stomach.    11. Please shampoo your hair the day of the sleep study. Please DO NOT use any other hair or skin products before your arrival (e.g., mousse, gel, hair or body spray, perfume, body lotion etc.) NOTE: Women should not wear heavy makeup prior to arrival as some wires are taped to the face area.    12. The technician will be applying several small electrodes to the scalp, eye area, chin, chest, and legs, plus respiratory effort belts around the chest. Also, there will be a device placed directly under the nose. (THIS WILL NOT OBSTRUCT  YOUR BREATHING.) This is a painless procedure and the skin is not broken.    13. The test is generally completed in six to eight hours; We are usually done between 6 - 7 a.m., unless you are scheduled for a nap study. You may need to come back another night for a second study to complete your treatment plan.    14. Patients who are scheduled for an MSLT (nap study) will stay at the sleep center for the day following their nighttime study. You will be notified if a nap study was ordered for you at the time the night study is scheduled. Generally, patients having a nap study will leave the sleep center by 4 p.m.    15. You will need to bring food for the following day if you are scheduled for a nap study. A refrigerator and microwave are available.    16. A bathroom is available for your use.    17. We are able to adjust the room temperature for your comfort. Please let the technologist know if you are uncomfortable during the study.    18. If you sleep better with a special pillow or stuffed animal, you may bring it along. Service animals are the only live animals permitted.    19. Cable T.V. is available.    20. You will be scheduled for a follow-up appointment three to five days after the sleep study to review your results.    21. A copy of your sleep study is sent to the referring physician approximately two weeks after your study.    22. Any questions can be directed to our staff at [phone].    23. If CPAP therapy is applied, a home unit will be ordered for you through the [employer] company. You will be contacted to schedule delivery after insurance authorization.

## 2021-10-26 NOTE — PROGRESS NOTES
[facility] MEDICINE     PATIENT ID:  NAME:  Fan Olivia  MRN:               [id number]  YOB: 1970    Date: 3:20 PM      Resident: Marco Robertson DO    CC:  New patient      HPI: Fan Olivia is a 51 y.o. male who presented to [facility] care    Problem   Tinnitus    -History of tinnitus  -Ongoing for approximately 1 year  -Had a hearing test which he failed  -Referral to ENT placed       Headache    -Patient with history of headaches, describes as tension-like  -Has them about 3-4 times per day Tylenol/Motrin helps a little bit  -Interested in starting a daily medicine  -Admits that he has been having a lot of life social stressors which may have been worsening his stress.  Including a recent divorce  -Amitriptyline prescribed for patient       Snoring    - Patient reports that he has been snoring, feels fatigue during the day, Mallampati score of 3  - Agrees to sleep study            REVIEW OF SYSTEMS:   Ten systems reviewed and were negative except as noted in the HPI.                PROBLEM LIST  Patient Active Problem List   Diagnosis   • Current smoker   • Elevated fasting glucose   • Vitamin D deficiency   • Tinnitus   • Headache   • Snoring        PAST SURGICAL HISTORY:  Past Surgical History:   Procedure Laterality Date   • CHOLECYSTECTOMY  2013       FAMILY HISTORY:  Family History   Problem Relation Age of Onset   • Cancer Mother         Unknown type   • Hypertension Father    • Cancer Sister         Breast Cancer       SOCIAL HISTORY:   Social History     Tobacco Use   • Smoking status: Current Some Day Smoker     Packs/day: 0.25     Years: 32.00     Pack years: 8.00     Types: Cigarettes   • Smokeless tobacco: Never Used   Substance Use Topics   • Alcohol use: Yes     Comment: occasionally       ALLERGIES:  No Known Allergies    OUTPATIENT MEDICATIONS:    Current Outpatient Medications:   •  amitriptyline (ELAVIL) 10 MG Tab, Take 1 Tablet by mouth at bedtime., Disp: 90 Tablet, Rfl: 1  •   Cholecalciferol 54287 UNIT Cap, Take 1 capsule orally once weekly for 8 weeks, followed by 1 capsule twice monthly for maintenance., Disp: 30 Cap, Rfl: 0    PHYSICAL EXAM:  There were no vitals filed for this visit.    General: Pt resting in NAD, cooperative   Skin:  Pink, warm and dry.  HEENT: NC/AT. EOMI.  Lungs:  Symmetrical.  CTAB, good air movement   Cardiovascular:  S1/S2 RRR   Abdomen:  Abdomen is soft, nontender  Extremities:  Full range of motion.  CNS:  Muscle tone is normal. No gross focal neurologic deficits      ASSESSMENT/PLAN:   51 y.o. male     Problem List Items Addressed This Visit     Tinnitus     -History of tinnitus  -Ongoing for approximately 1 year  -Had a hearing test which he failed  -Referral to ENT placed           Headache     Patient with history of headaches, describes as tension-like  -Has them about 3-4 times per day Tylenol/Motrin helps a little bit  -Interested in starting a daily medicine    -Amitriptyline prescribed for patient. F/u in ~1 month           Relevant Medications    amitriptyline (ELAVIL) 10 MG Tab    Snoring     - Patient reports that he has been snoring, feels fatigue during the day, Mallampati score of 3  - Agrees to sleep study     - Sleep study ordered today          Relevant Orders    Polysomnography, 4 or More    REFERRAL TO PULMONARY AND SLEEP MEDICINE          Marco Robertson, DO  PGY-3  [facility]

## 2021-10-26 NOTE — ASSESSMENT & PLAN NOTE
-History of tinnitus  -Ongoing for approximately 1 year  -Had a hearing test which he failed  -Referral to ENT placed

## 2022-01-10 ENCOUNTER — TELEPHONE (OUTPATIENT)
Dept: SLEEP MEDICINE | Facility: MEDICAL CENTER | Age: 52
End: 2022-01-10